# Patient Record
(demographics unavailable — no encounter records)

---

## 2024-10-09 NOTE — PHYSICAL EXAM
[5___] : hamstring 5[unfilled]/5 [Right] : right knee [There are no fractures, subluxations or dislocations. No significant abnormalities are seen] : There are no fractures, subluxations or dislocations. No significant abnormalities are seen [No loss of surgical correlation. Bony alignment acceptable. Hardware in appropriate position] : No loss of surgical correlation. Bony alignment acceptable. Hardware in appropriate position [] : no erythema [TWNoteComboBox7] : flexion 95 degrees [de-identified] : extension 5 degrees

## 2024-10-09 NOTE — PROCEDURE
[Large Joint Injection] : Large joint injection [Left] : of the left [Knee] : knee [Pain] : pain [X-ray evidence of Osteoarthritis on this or prior visit] : x-ray evidence of Osteoarthritis on this or prior visit [Alcohol] : alcohol [Betadine] : betadine [Ethyl Chloride sprayed topically] : ethyl chloride sprayed topically [Sterile technique used] : sterile technique used [Call if redness, pain or fever occur] : call if redness, pain or fever occur [Apply ice for 15min out of every hour for the next 12-24 hours as tolerated] : apply ice for 15 minutes out of every hour for the next 12-24 hours as tolerated [Previous OTC use and PT nontherapeutic] : patient has tried OTC's including aspirin, Ibuprofen, Aleve, etc or prescription NSAIDS, and/or exercises at home and/or physical therapy without satisfactory response [Patient had decreased mobility in the joint] : patient had decreased mobility in the joint [Risks, benefits, alternatives discussed / Verbal consent obtained] : the risks benefits, and alternatives have been discussed, and verbal consent was obtained [Euflexxa(20mg)] : 20mg of Euflexxa [#1] : series #1

## 2024-10-09 NOTE — HISTORY OF PRESENT ILLNESS
[de-identified] : 68F 4ms s/p R TKA. Doing well, still having pain but improving, she has pain mostly after sitting for a little while, no pain with walking, done with PT, denies n/v/f/c. 7/5/23: f/u 7 months s/p R TKA, minimal pain, ambulating unassisted, denies n/v/f/c  10/4/23: 70yo F returns with increasing left knee pain over the past few weeks with no injury. She has done CSI and Euflexxa for this knee in the past to some relief. She is leaving on vacation to [place] on 11/12/23.  10/11/23 euflexxa 2 today 10/19/23: Here for Euflexxa #3 L knee    03/20/2024: Here for follow up. Only temporary relief with gel injections. consider TKA after the summer  09/25/2024: pt is here today for f/u on L knee, pt states she was doing well until she started to have pain again about two weeks ago.  10/02/24 - pt is here foreuflexxa 2 10/08/24: Patient is here for euflexxa #3

## 2024-10-09 NOTE — ASSESSMENT
[FreeTextEntry1] : 68 y/o F with L OA, R s/p TKA  Continue conservative mgmt for now euflexxa 3 tolerated well return 1 week to continue   The risks, benefits, contents and alternatives to injection were explained in full to the patient. Risks outlined include but are not limited to infection, sepsis, bleeding, scarring, skin discoloration, temporary increase in pain, syncopal episode, failure to resolve symptoms, allergic reaction, flare reaction, permanent white skin discoloration, symptom recurrence, and elevation of blood sugar in diabetics. Patient understood the risks. All questions were answered. After discussion of options, patient requested an injection. Oral informed consent was obtained and sterile prep was done of the injection site. Sterile technique was used to introduce the mixture. Patient tolerated the procedure well. Patient advised to ice the injection site this evening. Signs and symptoms of infection reviewed and patient advised to call immediately for redness, fevers, and/or chills.

## 2025-05-14 NOTE — PROCEDURE
[FreeTextEntry3] : Large joint injection was performed of the left knee. The indication for this procedure was pain and x-ray evidence of Osteoarthritis on this or prior visit. The site was prepped with alcohol, betadine, ethyl chloride sprayed topically and sterile technique used. An injection of 20mg of Euflexxa, series #1 was used. Patient was advised to call if redness, pain or fever occur and apply ice for 15 minutes out of every hour for the next 12-24 hours as tolerated.    Patient has tried OTC's including aspirin, Ibuprofen, Aleve, etc or prescription NSAIDS, and/or exercises at home and/or physical therapy without satisfactory response, patient had decreased mobility in the joint and the risks benefits, and alternatives have been discussed, and verbal consent was obtained.

## 2025-05-14 NOTE — PHYSICAL EXAM
[5___] : hamstring 5[unfilled]/5 [Right] : right knee [There are no fractures, subluxations or dislocations. No significant abnormalities are seen] : There are no fractures, subluxations or dislocations. No significant abnormalities are seen [No loss of surgical correlation. Bony alignment acceptable. Hardware in appropriate position] : No loss of surgical correlation. Bony alignment acceptable. Hardware in appropriate position [] : no erythema [TWNoteComboBox7] : flexion 95 degrees [de-identified] : extension 5 degrees

## 2025-05-14 NOTE — ASSESSMENT
[FreeTextEntry1] : 68 y/o F with L OA, R s/p TKA  Discussed Euflexxa injections with the patient.  Risks and benefits discussed with the patient. Discussed timeline of repeat injections.   The risks, benefits, contents and alternatives to injection were explained in full to the patient. Risks outlined include but are not limited to infection, sepsis, bleeding, scarring, skin discoloration, temporary increase in pain, syncopal episode, failure to resolve symptoms, allergic reaction, flare reaction, permanent white skin discoloration, symptom recurrence, and elevation of blood sugar in diabetics. Patient understood the risks. All questions were answered. After discussion of options, patient requested an injection. Oral informed consent was obtained and sterile prep was done of the injection site. Sterile technique was used to introduce the mixture. Patient tolerated the procedure well. Patient advised to ice the injection site this evening. Signs and symptoms of infection reviewed and patient advised to call immediately for redness, fevers, and/or chills.

## 2025-05-14 NOTE — HISTORY OF PRESENT ILLNESS
[de-identified] : 68F 4ms s/p R TKA. Doing well, still having pain but improving, she has pain mostly after sitting for a little while, no pain with walking, done with PT, denies n/v/f/c. 7/5/23: f/u 7 months s/p R TKA, minimal pain, ambulating unassisted, denies n/v/f/c  10/4/23: 70yo F returns with increasing left knee pain over the past few weeks with no injury. She has done CSI and Euflexxa for this knee in the past to some relief. She is leaving on vacation to [place] on 11/12/23.  10/11/23 euflexxa 2 today 10/19/23: Here for Euflexxa #3 L knee    03/20/2024: Here for follow up. Only temporary relief with gel injections. consider TKA after the summer  09/25/2024: pt is here today for f/u on L knee, pt states she was doing well until she started to have pain again about two weeks ago.  10/02/24 - pt is here for euflexxa 2 10/08/24: Patient is here for euflexxa #3  5/14/25 pt is here to fu on the left knee today. Wants to restart gel inj.

## 2025-05-21 NOTE — ASSESSMENT
[FreeTextEntry1] : 70 y/o F with L OA, R s/p TKA  Euflexxa #2 L knee  The risks, benefits, contents and alternatives to injection were explained in full to the patient. Risks outlined include but are not limited to infection, sepsis, bleeding, scarring, skin discoloration, temporary increase in pain, syncopal episode, failure to resolve symptoms, allergic reaction, flare reaction, permanent white skin discoloration, symptom recurrence, and elevation of blood sugar in diabetics. Patient understood the risks. All questions were answered. After discussion of options, patient requested an injection. Oral informed consent was obtained and sterile prep was done of the injection site. Sterile technique was used to introduce the mixture. Patient tolerated the procedure well. Patient advised to ice the injection site this evening. Signs and symptoms of infection reviewed and patient advised to call immediately for redness, fevers, and/or chills.

## 2025-05-21 NOTE — PROCEDURE
[FreeTextEntry3] : Large joint injection was performed of the left knee. The indication for this procedure was pain and x-ray evidence of Osteoarthritis on this or prior visit. The site was prepped with alcohol, betadine, ethyl chloride sprayed topically and sterile technique used. An injection of 20mg of Euflexxa, series #2 was used. Patient was advised to call if redness, pain or fever occur and apply ice for 15 minutes out of every hour for the next 12-24 hours as tolerated.    Patient has tried OTC's including aspirin, Ibuprofen, Aleve, etc or prescription NSAIDS, and/or exercises at home and/or physical therapy without satisfactory response, patient had decreased mobility in the joint and the risks benefits, and alternatives have been discussed, and verbal consent was obtained.

## 2025-05-21 NOTE — PHYSICAL EXAM
[5___] : hamstring 5[unfilled]/5 [Right] : right knee [There are no fractures, subluxations or dislocations. No significant abnormalities are seen] : There are no fractures, subluxations or dislocations. No significant abnormalities are seen [No loss of surgical correlation. Bony alignment acceptable. Hardware in appropriate position] : No loss of surgical correlation. Bony alignment acceptable. Hardware in appropriate position [] : no erythema [TWNoteComboBox7] : flexion 95 degrees [de-identified] : extension 5 degrees

## 2025-05-21 NOTE — HISTORY OF PRESENT ILLNESS
[de-identified] : 68F 4ms s/p R TKA. Doing well, still having pain but improving, she has pain mostly after sitting for a little while, no pain with walking, done with PT, denies n/v/f/c. 7/5/23: f/u 7 months s/p R TKA, minimal pain, ambulating unassisted, denies n/v/f/c  10/4/23: 68yo F returns with increasing left knee pain over the past few weeks with no injury. She has done CSI and Euflexxa for this knee in the past to some relief. She is leaving on vacation to [place] on 11/12/23.  10/11/23 euflexxa 2 today 10/19/23: Here for Euflexxa #3 L knee    03/20/2024: Here for follow up. Only temporary relief with gel injections. consider TKA after the summer  09/25/2024: pt is here today for f/u on L knee, pt states she was doing well until she started to have pain again about two weeks ago.  10/02/24 - pt is here for euflexxa 2 10/08/24: Patient is here for euflexxa #3  5/14/25 pt is here to fu on the left knee today. Wants to restart gel inj.  5/21/25 pt is here for Euflexxa #2 inj for the left knee today.

## 2025-05-28 NOTE — HISTORY OF PRESENT ILLNESS
[de-identified] : 68F 4ms s/p R TKA. Doing well, still having pain but improving, she has pain mostly after sitting for a little while, no pain with walking, done with PT, denies n/v/f/c. 7/5/23: f/u 7 months s/p R TKA, minimal pain, ambulating unassisted, denies n/v/f/c  10/4/23: 68yo F returns with increasing left knee pain over the past few weeks with no injury. She has done CSI and Euflexxa for this knee in the past to some relief. She is leaving on vacation to [place] on 11/12/23.  10/11/23 euflexxa 2 today 10/19/23: Here for Euflexxa #3 L knee    03/20/2024: Here for follow up. Only temporary relief with gel injections. consider TKA after the summer  09/25/2024: pt is here today for f/u on L knee, pt states she was doing well until she started to have pain again about two weeks ago.  10/02/24 - pt is here for euflexxa 2 10/08/24: Patient is here for euflexxa #3  5/14/25 pt is here to fu on the left knee today. Wants to restart gel inj.  5/21/25 pt is here for Euflexxa #2 inj for the left knee today.  05/28/25: Pt is here for Euflexxa #3 inj for the left knee today

## 2025-05-28 NOTE — ASSESSMENT
[FreeTextEntry1] : 70y/o F with L OA, R s/p TKA  Euflexxa #3 L knee  The risks, benefits, contents and alternatives to injection were explained in full to the patient. Risks outlined include but are not limited to infection, sepsis, bleeding, scarring, skin discoloration, temporary increase in pain, syncopal episode, failure to resolve symptoms, allergic reaction, flare reaction, permanent white skin discoloration, symptom recurrence, and elevation of blood sugar in diabetics. Patient understood the risks. All questions were answered. After discussion of options, patient requested an injection. Oral informed consent was obtained and sterile prep was done of the injection site. Sterile technique was used to introduce the mixture. Patient tolerated the procedure well. Patient advised to ice the injection site this evening. Signs and symptoms of infection reviewed and patient advised to call immediately for redness, fevers, and/or chills.

## 2025-05-28 NOTE — PROCEDURE
[FreeTextEntry3] : Large joint injection was performed of the left knee. The indication for this procedure was pain and x-ray evidence of Osteoarthritis on this or prior visit. The site was prepped with alcohol, betadine, ethyl chloride sprayed topically and sterile technique used. An injection of 20mg of Euflexxa, series #3 was used. Patient was advised to call if redness, pain or fever occur and apply ice for 15 minutes out of every hour for the next 12-24 hours as tolerated.    Patient has tried OTC's including aspirin, Ibuprofen, Aleve, etc or prescription NSAIDS, and/or exercises at home and/or physical therapy without satisfactory response, patient had decreased mobility in the joint and the risks benefits, and alternatives have been discussed, and verbal consent was obtained.

## 2025-05-28 NOTE — PHYSICAL EXAM
[5___] : hamstring 5[unfilled]/5 [Right] : right knee [There are no fractures, subluxations or dislocations. No significant abnormalities are seen] : There are no fractures, subluxations or dislocations. No significant abnormalities are seen [No loss of surgical correlation. Bony alignment acceptable. Hardware in appropriate position] : No loss of surgical correlation. Bony alignment acceptable. Hardware in appropriate position [] : no erythema [TWNoteComboBox7] : flexion 95 degrees [de-identified] : extension 5 degrees